# Patient Record
Sex: FEMALE | URBAN - METROPOLITAN AREA
[De-identification: names, ages, dates, MRNs, and addresses within clinical notes are randomized per-mention and may not be internally consistent; named-entity substitution may affect disease eponyms.]

---

## 2024-10-07 ENCOUNTER — APPOINTMENT (RX ONLY)
Dept: URBAN - METROPOLITAN AREA CLINIC 179 | Facility: CLINIC | Age: 79
Setting detail: DERMATOLOGY
End: 2024-10-07

## 2024-10-07 PROBLEM — D48.5 NEOPLASM OF UNCERTAIN BEHAVIOR OF SKIN: Status: ACTIVE | Noted: 2024-10-07

## 2024-10-07 PROCEDURE — 11301 SHAVE SKIN LESION 0.6-1.0 CM: CPT

## 2024-10-07 PROCEDURE — ? SHAVE REMOVAL AND DESTRUCTION

## 2024-10-07 NOTE — PROCEDURE: SHAVE REMOVAL AND DESTRUCTION
Detail Level: Detailed
Size Of Lesion In Cm: 0.7
Size After Destruction (Required For Destruction Billing): 0.8
Anesthesia Type: 1% lidocaine without epinephrine
Anesthesia Volume In Cc: 1
Hemostasis: Electrocautery
Cautery Type: electrodesiccation
Was Curettage Performed?: Yes
Number Of Curettages: 3
Wound Care: Aquaphor
Dressing: Band-Aid
Bill As?: Note: Bill Malignant Destruction If Path Confirms Malignant Lesion. Only Bill As Shave Removal If Path Comes Back Benign. Do Not Bill Shave Removal On Malignant Lesions.: Shave Removal
Lab: [id number]
Lab Facility: [id number]
Path Notes (To The Dermatopathologist): 0.7 cm
Render Path Notes In Note?: No
Consent: Verbal consent was obtained and risks were reviewed including but not limited to scarring, infection, bleeding, scabbing, incomplete removal, nerve damage and allergy to anesthesia.
Post-Care Instructions: I reviewed with the patient in detail post-care instructions. Patient is to keep the biopsy site dry overnight, and then apply vaseline daily until healed. Patient may apply hydrogen peroxide soaks to remove any crusting.
Notification Instructions: Patient will be notified of biopsy results. However, patient instructed to call the office if not contacted within 2 weeks.
Billing Type: Third-Party Bill

## 2025-04-07 ENCOUNTER — APPOINTMENT (OUTPATIENT)
Dept: URBAN - METROPOLITAN AREA CLINIC 179 | Facility: CLINIC | Age: 80
Setting detail: DERMATOLOGY
End: 2025-04-07

## 2025-04-07 DIAGNOSIS — B37.2 CANDIDIASIS OF SKIN AND NAIL: ICD-10-CM | Status: WORSENING

## 2025-04-07 PROCEDURE — ? COUNSELING

## 2025-04-07 PROCEDURE — 99212 OFFICE O/P EST SF 10 MIN: CPT

## 2025-04-07 PROCEDURE — ? ADDITIONAL NOTES

## 2025-04-07 ASSESSMENT — LOCATION DETAILED DESCRIPTION DERM
LOCATION DETAILED: LEFT MIDDLE FINGERNAIL
LOCATION DETAILED: EPIGASTRIC SKIN

## 2025-04-07 ASSESSMENT — LOCATION SIMPLE DESCRIPTION DERM
LOCATION SIMPLE: ABDOMEN
LOCATION SIMPLE: LEFT MIDDLE FINGERNAIL

## 2025-04-07 ASSESSMENT — LOCATION ZONE DERM
LOCATION ZONE: TRUNK
LOCATION ZONE: FINGERNAIL

## 2025-04-07 NOTE — PROCEDURE: ADDITIONAL NOTES
Additional Notes: Severe contraindication with flecainide - unable to prescribe oral lamisil or fluconazole.
Detail Level: Simple
Render Risk Assessment In Note?: no

## 2025-06-30 ENCOUNTER — RX ONLY (RX ONLY)
Age: 80
End: 2025-06-30

## 2025-06-30 RX ORDER — CICLOPIROX 80 MG/ML
1 SOLUTION TOPICAL QD
Qty: 6.6 | Refills: 5 | Status: ERX | COMMUNITY
Start: 2025-06-30

## 2025-08-18 ENCOUNTER — APPOINTMENT (OUTPATIENT)
Dept: URBAN - METROPOLITAN AREA CLINIC 179 | Facility: CLINIC | Age: 80
Setting detail: DERMATOLOGY
End: 2025-08-18

## 2025-08-18 DIAGNOSIS — B37.2 CANDIDIASIS OF SKIN AND NAIL: ICD-10-CM

## 2025-08-18 PROCEDURE — ? ADDITIONAL NOTES

## 2025-08-18 PROCEDURE — ? COUNSELING

## 2025-08-18 PROCEDURE — ? PRESCRIPTION MEDICATION MANAGEMENT

## 2025-08-18 ASSESSMENT — LOCATION DETAILED DESCRIPTION DERM
LOCATION DETAILED: LEFT MIDDLE FINGERNAIL
LOCATION DETAILED: RIGHT RING FINGERNAIL

## 2025-08-18 ASSESSMENT — LOCATION SIMPLE DESCRIPTION DERM
LOCATION SIMPLE: RIGHT RING FINGERNAIL
LOCATION SIMPLE: LEFT MIDDLE FINGERNAIL

## 2025-08-18 ASSESSMENT — LOCATION ZONE DERM: LOCATION ZONE: FINGERNAIL
